# Patient Record
Sex: MALE | URBAN - NONMETROPOLITAN AREA
[De-identification: names, ages, dates, MRNs, and addresses within clinical notes are randomized per-mention and may not be internally consistent; named-entity substitution may affect disease eponyms.]

---

## 2020-08-24 ENCOUNTER — OFFICE VISIT (OUTPATIENT)
Dept: URBAN - NONMETROPOLITAN AREA CLINIC 13 | Facility: CLINIC | Age: 50
End: 2020-08-24

## 2021-04-05 ENCOUNTER — DASHBOARD ENCOUNTERS (OUTPATIENT)
Age: 51
End: 2021-04-05

## 2021-04-05 ENCOUNTER — OFFICE VISIT (OUTPATIENT)
Dept: URBAN - NONMETROPOLITAN AREA CLINIC 13 | Facility: CLINIC | Age: 51
End: 2021-04-05
Payer: COMMERCIAL

## 2021-04-05 DIAGNOSIS — K75.81 NASH (NONALCOHOLIC STEATOHEPATITIS): ICD-10-CM

## 2021-04-05 DIAGNOSIS — K76.0 FATTY LIVER DISEASE, NONALCOHOLIC: ICD-10-CM

## 2021-04-05 DIAGNOSIS — E83.110 HEREDITARY HEMOCHROMATOSIS: ICD-10-CM

## 2021-04-05 PROCEDURE — 99213 OFFICE O/P EST LOW 20 MIN: CPT | Performed by: INTERNAL MEDICINE

## 2021-04-05 RX ORDER — PANTOPRAZOLE SODIUM 40 MG/1
GRANULE, DELAYED RELEASE ORAL
Qty: 0 | Refills: 0 | Status: DISCONTINUED | COMMUNITY
Start: 1900-01-01

## 2021-04-05 RX ORDER — GLIMEPIRIDE 2 MG/1
TABLET ORAL
Qty: 0 | Refills: 0 | Status: ACTIVE | COMMUNITY
Start: 1900-01-01

## 2021-04-05 RX ORDER — PANTOPRAZOLE SODIUM 40 MG/1
TAKE 1 TABLET (40 MG) BY ORAL ROUTE 2 TIMES PER DAY FOR 90 DAYS TABLET, DELAYED RELEASE ORAL 2
Qty: 180 | Refills: 3 | Status: ACTIVE | COMMUNITY
Start: 2020-04-23 | End: 2021-04-18

## 2021-04-05 NOTE — HPI-TODAY'S VISIT:
Mr. Bermudez returns for follow-up of abnormal LFTs.  He has a history of ARIAS as well as hereditary hemochromatosis.  His last set of LFTs showed AST 78 and .  Today he reports that he is doing ok.  He gets pains in his bilateral ribcage and does not know why.

## 2021-07-12 ENCOUNTER — OFFICE VISIT (OUTPATIENT)
Dept: URBAN - NONMETROPOLITAN AREA CLINIC 13 | Facility: CLINIC | Age: 51
End: 2021-07-12

## 2021-07-12 PROBLEM — 197315008: Status: ACTIVE | Noted: 2021-04-05

## 2021-07-12 PROBLEM — 35400008: Status: ACTIVE | Noted: 2021-04-05

## 2021-07-12 PROBLEM — 442685003: Status: ACTIVE | Noted: 2021-04-05

## 2021-07-12 RX ORDER — GLIMEPIRIDE 2 MG/1
TABLET ORAL
Qty: 0 | Refills: 0 | Status: ACTIVE | COMMUNITY
Start: 1900-01-01

## 2021-07-12 NOTE — HPI-TODAY'S VISIT:
Mr. Bermudez returns for follow-up of abnormal LFTs.  He has a history of ARIAS as well as hereditary hemochromatosis.  His last set of LFTs showed AST 78 and .  Today he reports that he is doing ok.  He gets pains in his bilateral ribcage and does not know why.   1/2020 liver bx marked steatosis with steatoheaptitis 4/2020 EGD reflux on bx  and colonoscopy with 3 polyps all TA and one was 10mm. repeat  3 years

## 2024-06-25 ENCOUNTER — LAB OUTSIDE AN ENCOUNTER (OUTPATIENT)
Dept: URBAN - NONMETROPOLITAN AREA CLINIC 2 | Facility: CLINIC | Age: 54
End: 2024-06-25

## 2024-06-25 ENCOUNTER — OFFICE VISIT (OUTPATIENT)
Dept: URBAN - NONMETROPOLITAN AREA CLINIC 13 | Facility: CLINIC | Age: 54
End: 2024-06-25
Payer: COMMERCIAL

## 2024-06-25 ENCOUNTER — LAB OUTSIDE AN ENCOUNTER (OUTPATIENT)
Dept: URBAN - NONMETROPOLITAN AREA CLINIC 13 | Facility: CLINIC | Age: 54
End: 2024-06-25

## 2024-06-25 VITALS
SYSTOLIC BLOOD PRESSURE: 144 MMHG | HEIGHT: 67 IN | BODY MASS INDEX: 33.9 KG/M2 | DIASTOLIC BLOOD PRESSURE: 89 MMHG | WEIGHT: 216 LBS | HEART RATE: 76 BPM

## 2024-06-25 DIAGNOSIS — K75.81 NASH (NONALCOHOLIC STEATOHEPATITIS): ICD-10-CM

## 2024-06-25 DIAGNOSIS — Z90.49 S/P CHOLECYSTECTOMY: ICD-10-CM

## 2024-06-25 DIAGNOSIS — K76.0 FATTY LIVER DISEASE, NONALCOHOLIC: ICD-10-CM

## 2024-06-25 DIAGNOSIS — E11.9 TYPE 2 DIABETES MELLITUS WITHOUT COMPLICATION, UNSPECIFIED WHETHER LONG TERM INSULIN USE: ICD-10-CM

## 2024-06-25 DIAGNOSIS — E83.110 HEREDITARY HEMOCHROMATOSIS: ICD-10-CM

## 2024-06-25 DIAGNOSIS — Z86.010 HX OF COLONIC POLYPS: ICD-10-CM

## 2024-06-25 PROBLEM — 428283002: Status: ACTIVE | Noted: 2024-06-25

## 2024-06-25 PROBLEM — 313436004: Status: ACTIVE | Noted: 2024-06-25

## 2024-06-25 PROBLEM — 428882003: Status: ACTIVE | Noted: 2024-06-25

## 2024-06-25 PROCEDURE — 99244 OFF/OP CNSLTJ NEW/EST MOD 40: CPT | Performed by: NURSE PRACTITIONER

## 2024-06-25 RX ORDER — GLIMEPIRIDE 2 MG/1
TABLET ORAL
Qty: 0 | Refills: 0 | Status: ON HOLD | COMMUNITY
Start: 1900-01-01

## 2024-06-25 RX ORDER — NADOLOL 20 MG/1
TABLET ORAL
Qty: 90 TABLET | Status: ACTIVE | COMMUNITY

## 2024-06-25 RX ORDER — AMITRIPTYLINE HYDROCHLORIDE 10 MG/1
TAKE 1 TABLET BY MOUTH ONCE DAILY AT BEDTIME TABLET, FILM COATED ORAL
Qty: 30 EACH | Refills: 0 | Status: ACTIVE | COMMUNITY

## 2024-06-25 RX ORDER — GLIMEPIRIDE 4 MG/1
TABLET ORAL
Qty: 180 TABLET | Status: ACTIVE | COMMUNITY

## 2024-06-25 RX ORDER — AMLODIPINE BESYLATE 2.5 MG/1
TABLET ORAL
Qty: 90 TABLET | Status: ACTIVE | COMMUNITY

## 2024-06-25 RX ORDER — PANTOPRAZOLE 40 MG/1
TAKE 1 TABLET BY MOUTH TWICE DAILY TABLET, DELAYED RELEASE ORAL
Qty: 180 EACH | Refills: 1 | Status: ACTIVE | COMMUNITY

## 2024-06-25 NOTE — HPI-OTHER HISTORIES
7/2021: Mr. Bermudez returns for follow-up of abnormal LFTs. He has a history of ARIAS as well as hereditary hemochromatosis. His last set of LFTs showed AST 78 and . Today he reports that he is doing ok. He gets pains in his bilateral ribcage and does not know why. 1/2020 liver bx marked steatosis with steatoheaptitis 4/2020 EGD reflux on bx and colonoscopy with 3 polyps all TA and one was 10mm. repeat 3 years

## 2024-06-25 NOTE — HPI-TODAY'S VISIT:
Mr. Boo Bermudez is a 54-year-old male who was referred to our office by Dr. More marcial for recurrent elevated transaminases.  He was last seen in 2021 for known history of George and hereditary hemochromatosis.  Liver biopsy performed in 2020 was consistent with stage II fibrosis.  He continues to follow with Dr. Chaves at [facility] for hemochromatosis.  Last phlebotomy was in March.  Ferritin remains elevated but is better controlled than in the past.  Unfortunately his AST and ALT have increased more than expected.  He denies any ascites, jaundice, abdominal pain, bleeding, or acute change in mentation.  Diabetes is poorly controlled.  LG.

## 2024-06-30 LAB
ACTIN (SMOOTH MUSCLE) ANTIBODY: 4
ALBUMIN: 4.5
ALKALINE PHOSPHATASE: 145
ALPHA-1-ANTITRYPSIN, SERUM: 160
ALT (SGPT): 211
ANTINUCLEAR ANTIBODIES, IFA: NEGATIVE
AST (SGOT): 215
BILIRUBIN, TOTAL: 0.7
BONE FRACTION:: 40
BUN/CREATININE RATIO: 10
BUN: 8
CALCIUM: 9.8
CARBON DIOXIDE, TOTAL: 26
CHLORIDE: 97
CREATININE: 0.8
DEAMIDATED GLIADIN ABS, IGA: 14
DEAMIDATED GLIADIN ABS, IGG: 3
EGFR: 106
GGT: 223
GLOBULIN, TOTAL: 3.3
GLUCOSE: 315
HBSAG SCREEN: NEGATIVE
HCV AB: NON REACTIVE
HEP A AB, IGM: NEGATIVE
HEP B CORE AB, IGM: NEGATIVE
IGG, SUBCLASS 4: 112
IMMUNOGLOBULIN A, QN, SERUM: 576
INR: 1.1
INTERPRETATION:: (no result)
INTESTINAL FRAC.:: 36
LIVER FRACTION:: 24
MITOCHONDRIAL (M2) ANTIBODY: <20
POTASSIUM: 4.8
PROTEIN, TOTAL: 7.8
PROTHROMBIN TIME: 11
SODIUM: 138
T-TRANSGLUTAMINASE (TTG) IGA: <2
T-TRANSGLUTAMINASE (TTG) IGG: <2
T4,FREE(DIRECT): 1.28
TSH: 0.5

## 2024-08-02 ENCOUNTER — OFFICE VISIT (OUTPATIENT)
Dept: URBAN - NONMETROPOLITAN AREA CLINIC 2 | Facility: CLINIC | Age: 54
End: 2024-08-02

## 2024-08-09 ENCOUNTER — LAB OUTSIDE AN ENCOUNTER (OUTPATIENT)
Dept: URBAN - NONMETROPOLITAN AREA CLINIC 2 | Facility: CLINIC | Age: 54
End: 2024-08-09

## 2024-08-09 ENCOUNTER — OFFICE VISIT (OUTPATIENT)
Dept: URBAN - NONMETROPOLITAN AREA CLINIC 2 | Facility: CLINIC | Age: 54
End: 2024-08-09
Payer: COMMERCIAL

## 2024-08-09 VITALS
BODY MASS INDEX: 34.69 KG/M2 | HEART RATE: 75 BPM | SYSTOLIC BLOOD PRESSURE: 133 MMHG | DIASTOLIC BLOOD PRESSURE: 85 MMHG | OXYGEN SATURATION: 97 % | WEIGHT: 221 LBS | HEIGHT: 67 IN

## 2024-08-09 DIAGNOSIS — E83.110 HEREDITARY HEMOCHROMATOSIS: ICD-10-CM

## 2024-08-09 DIAGNOSIS — K76.0 FATTY LIVER DISEASE, NONALCOHOLIC: ICD-10-CM

## 2024-08-09 DIAGNOSIS — Z86.010 HX OF COLONIC POLYPS: ICD-10-CM

## 2024-08-09 PROCEDURE — 99213 OFFICE O/P EST LOW 20 MIN: CPT | Performed by: NURSE PRACTITIONER

## 2024-08-09 RX ORDER — ASPIRIN 81 MG/1
1 TABLET TABLET, CHEWABLE ORAL ONCE A DAY
Status: ACTIVE | COMMUNITY

## 2024-08-09 RX ORDER — PANTOPRAZOLE 40 MG/1
TAKE 1 TABLET BY MOUTH TWICE DAILY TABLET, DELAYED RELEASE ORAL
Qty: 180 EACH | Refills: 1 | Status: ACTIVE | COMMUNITY

## 2024-08-09 RX ORDER — METFORMIN HYDROCHLORIDE 500 MG/1
TABLET, EXTENDED RELEASE ORAL
Qty: 92 TABLET | Status: ACTIVE | COMMUNITY

## 2024-08-09 RX ORDER — AMLODIPINE BESYLATE 2.5 MG/1
TABLET ORAL
Qty: 90 TABLET | Status: ACTIVE | COMMUNITY

## 2024-08-09 RX ORDER — AMITRIPTYLINE HYDROCHLORIDE 10 MG/1
TAKE 1 TABLET BY MOUTH ONCE DAILY AT BEDTIME TABLET, FILM COATED ORAL
Qty: 30 EACH | Refills: 0 | Status: ACTIVE | COMMUNITY

## 2024-08-09 RX ORDER — NADOLOL 20 MG/1
TABLET ORAL
Qty: 90 TABLET | Status: ACTIVE | COMMUNITY

## 2024-08-09 RX ORDER — INSULIN HUMAN 100 [IU]/ML
INJECT 40 UNITS SUBCUTANEOUSLY TWICE DAILY WITH MEALS INJECTION, SUSPENSION SUBCUTANEOUS
Qty: 21 MILLILITER | Refills: 0 | Status: ACTIVE | COMMUNITY

## 2024-08-09 NOTE — HPI-OTHER HISTORIES
7/2021: Mr. Bermudez returns for follow-up of abnormal LFTs. He has a history of GEORGE as well as hereditary hemochromatosis. His last set of LFTs showed AST 78 and . Today he reports that he is doing ok. He gets pains in his bilateral ribcage and does not know why. 1/2020 liver bx marked steatosis with steatoheaptitis 4/2020 EGD reflux on bx and colonoscopy with 3 polyps all TA and one was 10mm. repeat 3 years  6/2024: Mr. Boo Bermudez is a 54-year-old male who was referred to our office by Dr. More marcial for recurrent elevated transaminases. He was last seen in 2021 for known history of George and hereditary hemochromatosis. Liver biopsy performed in 2020 was consistent with stage II fibrosis. He continues to follow with Dr. Chaves at [facility] for hemochromatosis. Last phlebotomy was in March. Ferritin remains elevated but is better controlled than in the past. Unfortunately his AST and ALT have increased more than expected. He denies any ascites, jaundice, abdominal pain, bleeding, or acute change in mentation. Diabetes is poorly controlled. LG.

## 2024-08-09 NOTE — HPI-TODAY'S VISIT:
Mr. Boo Bermudez is a 54-year-old male who presents today for follow-up of chronic liver disease workup.  Full liver serologies were completed at last office visit where his AST, ALT, alk phos - fractionated with elevated intestinal isoenzymes -and GGT were all elevated.  FibroScan showed F4 disease.  Boo continues to deny any overt symptoms related to cirrhosis including ascites altered mental status, abdominal pain, bleeding, or jaundice.,  He is working on better controlling his blood sugar.  He continues to follow with Dr. Chaves for her dysuria hemochromatosis.  Boo is also overdue for a screening colonoscopy as his last was performed in 2020 and did a polyp burden, recommendation was to repeat in 3 years.  No hematochezia or recent change in bowel habits.  No known family history of colorectal cancer.  LG.

## 2024-08-12 ENCOUNTER — LAB OUTSIDE AN ENCOUNTER (OUTPATIENT)
Dept: URBAN - NONMETROPOLITAN AREA CLINIC 2 | Facility: CLINIC | Age: 54
End: 2024-08-12

## 2024-08-12 ENCOUNTER — TELEPHONE ENCOUNTER (OUTPATIENT)
Dept: URBAN - NONMETROPOLITAN AREA CLINIC 2 | Facility: CLINIC | Age: 54
End: 2024-08-12

## 2024-08-23 ENCOUNTER — TELEPHONE ENCOUNTER (OUTPATIENT)
Dept: URBAN - NONMETROPOLITAN AREA CLINIC 2 | Facility: CLINIC | Age: 54
End: 2024-08-23

## 2024-09-27 ENCOUNTER — OFFICE VISIT (OUTPATIENT)
Dept: URBAN - NONMETROPOLITAN AREA CLINIC 2 | Facility: CLINIC | Age: 54
End: 2024-09-27

## 2024-10-02 ENCOUNTER — WEB ENCOUNTER (OUTPATIENT)
Dept: URBAN - NONMETROPOLITAN AREA CLINIC 13 | Facility: CLINIC | Age: 54
End: 2024-10-02

## 2024-10-28 ENCOUNTER — OFFICE VISIT (OUTPATIENT)
Dept: URBAN - METROPOLITAN AREA MEDICAL CENTER 1 | Facility: MEDICAL CENTER | Age: 54
End: 2024-10-28

## 2024-10-28 RX ORDER — ASPIRIN 81 MG/1
1 TABLET TABLET, CHEWABLE ORAL ONCE A DAY
Status: ACTIVE | COMMUNITY

## 2024-10-28 RX ORDER — METFORMIN HYDROCHLORIDE 500 MG/1
TABLET, EXTENDED RELEASE ORAL
Qty: 92 TABLET | Status: ACTIVE | COMMUNITY

## 2024-10-28 RX ORDER — AMLODIPINE BESYLATE 2.5 MG/1
TABLET ORAL
Qty: 90 TABLET | Status: ACTIVE | COMMUNITY

## 2024-10-28 RX ORDER — NADOLOL 20 MG/1
TABLET ORAL
Qty: 90 TABLET | Status: ACTIVE | COMMUNITY

## 2024-10-28 RX ORDER — INSULIN HUMAN 100 [IU]/ML
INJECT 40 UNITS SUBCUTANEOUSLY TWICE DAILY WITH MEALS INJECTION, SUSPENSION SUBCUTANEOUS
Qty: 21 MILLILITER | Refills: 0 | Status: ACTIVE | COMMUNITY

## 2024-10-28 RX ORDER — AMITRIPTYLINE HYDROCHLORIDE 10 MG/1
TAKE 1 TABLET BY MOUTH ONCE DAILY AT BEDTIME TABLET, FILM COATED ORAL
Qty: 30 EACH | Refills: 0 | Status: ACTIVE | COMMUNITY

## 2024-10-28 RX ORDER — PANTOPRAZOLE 40 MG/1
TAKE 1 TABLET BY MOUTH TWICE DAILY TABLET, DELAYED RELEASE ORAL
Qty: 180 EACH | Refills: 1 | Status: ACTIVE | COMMUNITY

## 2024-12-06 ENCOUNTER — OFFICE VISIT (OUTPATIENT)
Dept: URBAN - NONMETROPOLITAN AREA CLINIC 2 | Facility: CLINIC | Age: 54
End: 2024-12-06
Payer: COMMERCIAL

## 2024-12-06 VITALS
SYSTOLIC BLOOD PRESSURE: 123 MMHG | TEMPERATURE: 98 F | HEART RATE: 92 BPM | BODY MASS INDEX: 34.21 KG/M2 | WEIGHT: 218 LBS | HEIGHT: 67 IN | DIASTOLIC BLOOD PRESSURE: 81 MMHG

## 2024-12-06 DIAGNOSIS — Z86.0100 HISTORY OF COLON POLYPS: ICD-10-CM

## 2024-12-06 DIAGNOSIS — Z90.49 S/P CHOLECYSTECTOMY: ICD-10-CM

## 2024-12-06 DIAGNOSIS — K76.0 FATTY LIVER DISEASE, NONALCOHOLIC: ICD-10-CM

## 2024-12-06 DIAGNOSIS — E83.110 HEREDITARY HEMOCHROMATOSIS: ICD-10-CM

## 2024-12-06 DIAGNOSIS — K75.81 NASH (NONALCOHOLIC STEATOHEPATITIS): ICD-10-CM

## 2024-12-06 DIAGNOSIS — E11.9 TYPE 2 DIABETES MELLITUS WITHOUT COMPLICATION, UNSPECIFIED WHETHER LONG TERM INSULIN USE: ICD-10-CM

## 2024-12-06 PROCEDURE — 99214 OFFICE O/P EST MOD 30 MIN: CPT | Performed by: NURSE PRACTITIONER

## 2024-12-06 RX ORDER — ASPIRIN 81 MG/1
1 TABLET TABLET, CHEWABLE ORAL ONCE A DAY
Status: ACTIVE | COMMUNITY

## 2024-12-06 RX ORDER — INSULIN HUMAN 100 [IU]/ML
INJECT 40 UNITS SUBCUTANEOUSLY TWICE DAILY WITH MEALS INJECTION, SUSPENSION SUBCUTANEOUS
Qty: 21 MILLILITER | Refills: 0 | Status: ACTIVE | COMMUNITY

## 2024-12-06 RX ORDER — PANTOPRAZOLE 40 MG/1
TAKE 1 TABLET BY MOUTH TWICE DAILY TABLET, DELAYED RELEASE ORAL
Qty: 180 EACH | Refills: 1 | Status: ACTIVE | COMMUNITY

## 2024-12-06 RX ORDER — AMLODIPINE BESYLATE 2.5 MG/1
TABLET ORAL
Qty: 90 TABLET | Status: ACTIVE | COMMUNITY

## 2024-12-06 RX ORDER — METFORMIN HYDROCHLORIDE 500 MG/1
TABLET, EXTENDED RELEASE ORAL
Qty: 92 TABLET | Status: ACTIVE | COMMUNITY

## 2024-12-06 RX ORDER — NADOLOL 20 MG/1
TABLET ORAL
Qty: 90 TABLET | Status: ACTIVE | COMMUNITY

## 2024-12-06 RX ORDER — AMITRIPTYLINE HYDROCHLORIDE 10 MG/1
TAKE 1 TABLET BY MOUTH ONCE DAILY AT BEDTIME TABLET, FILM COATED ORAL
Qty: 30 EACH | Refills: 0 | Status: ACTIVE | COMMUNITY

## 2024-12-06 RX ORDER — RESMETIROM 80 MG/1
ONE TABLET TABLET, COATED ORAL ONCE DAILY
Qty: 30 | Refills: 5 | OUTPATIENT
Start: 2024-12-06

## 2024-12-06 NOTE — HPI-TODAY'S VISIT:
Mr. Boo Bermudez is a 54-year-old male who presents today for follow-up of chronic liver disease.  Since his last visit he had a liver biopsy showing bridging fibrosis but no evidence of cirrhosis.  He also had a colonoscopy that was normal with recommendation to repeat in 5 years.  Today he reports feeling well.  He continues to undergo repeat phlebotomies to achieve appropriate ferritin.  He denies any abdominal pain, jaundice, ascites, altered mental status, or bleeding.  Weight is down 3 pounds.  He was started on Mounjaro but did not tolerate the symptoms and has since stopped.  LG.

## 2024-12-06 NOTE — HPI-OTHER HISTORIES
7/2021: Mr. Bermudez returns for follow-up of abnormal LFTs. He has a history of GEORGE as well as hereditary hemochromatosis. His last set of LFTs showed AST 78 and . Today he reports that he is doing ok. He gets pains in his bilateral ribcage and does not know why. 1/2020 liver bx marked steatosis with steatoheaptitis 4/2020 EGD reflux on bx and colonoscopy with 3 polyps all TA and one was 10mm. repeat 3 years  6/2024: Mr. Boo Bermudez is a 54-year-old male who was referred to our office by Dr. More marcial for recurrent elevated transaminases. He was last seen in 2021 for known history of George and hereditary hemochromatosis. Liver biopsy performed in 2020 was consistent with stage II fibrosis. He continues to follow with Dr. Chaves at [facility] for hemochromatosis. Last phlebotomy was in March. Ferritin remains elevated but is better controlled than in the past. Unfortunately his AST and ALT have increased more than expected. He denies any ascites, jaundice, abdominal pain, bleeding, or acute change in mentation. Diabetes is poorly controlled. LG.  8/9/2024: Mr. Boo Bermudez is a 54-year-old male who presents today for follow-up of chronic liver disease workup. Full liver serologies were completed at last office visit where his AST, ALT, alk phos - fractionated with elevated intestinal isoenzymes -and GGT were all elevated. FibroScan showed F4 disease. Boo continues to deny any overt symptoms related to cirrhosis including ascites altered mental status, abdominal pain, bleeding, or jaundice., He is working on better controlling his blood sugar. He continues to follow with Dr. Chaves for her dysuria hemochromatosis. Boo is also overdue for a screening colonoscopy as his last was performed in 2020 and did a polyp burden, recommendation was to repeat in 3 years. No hematochezia or recent change in bowel habits. No known family history of colorectal cancer. LG.

## 2025-02-02 ENCOUNTER — WEB ENCOUNTER (OUTPATIENT)
Dept: URBAN - NONMETROPOLITAN AREA CLINIC 2 | Facility: CLINIC | Age: 55
End: 2025-02-02

## 2025-02-02 RX ORDER — RESMETIROM 80 MG/1
ONE TABLET TABLET, COATED ORAL ONCE DAILY
Qty: 30 | Refills: 5
Start: 2024-12-06

## 2025-06-06 ENCOUNTER — OFFICE VISIT (OUTPATIENT)
Dept: URBAN - NONMETROPOLITAN AREA CLINIC 2 | Facility: CLINIC | Age: 55
End: 2025-06-06

## 2025-07-08 ENCOUNTER — WEB ENCOUNTER (OUTPATIENT)
Dept: URBAN - NONMETROPOLITAN AREA CLINIC 2 | Facility: CLINIC | Age: 55
End: 2025-07-08

## 2025-07-08 RX ORDER — RESMETIROM 80 MG/1
ONE TABLET TABLET, COATED ORAL ONCE DAILY
Qty: 30 | Refills: 5
Start: 2024-12-06